# Patient Record
Sex: FEMALE | Race: AMERICAN INDIAN OR ALASKA NATIVE
[De-identification: names, ages, dates, MRNs, and addresses within clinical notes are randomized per-mention and may not be internally consistent; named-entity substitution may affect disease eponyms.]

---

## 2020-03-09 ENCOUNTER — HOSPITAL ENCOUNTER (EMERGENCY)
Dept: HOSPITAL 5 - ED | Age: 30
Discharge: HOME | End: 2020-03-09
Payer: COMMERCIAL

## 2020-03-09 VITALS — SYSTOLIC BLOOD PRESSURE: 123 MMHG | DIASTOLIC BLOOD PRESSURE: 76 MMHG
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
## 2020-03-09 DIAGNOSIS — L29.8: ICD-10-CM

## 2020-03-09 DIAGNOSIS — Z79.899: ICD-10-CM

## 2020-03-09 DIAGNOSIS — Z88.0: ICD-10-CM

## 2020-03-09 DIAGNOSIS — T78.40XA: Primary | ICD-10-CM

## 2020-03-09 DIAGNOSIS — L50.0: ICD-10-CM

## 2020-03-09 PROCEDURE — 96375 TX/PRO/DX INJ NEW DRUG ADDON: CPT

## 2020-03-09 PROCEDURE — 96374 THER/PROPH/DIAG INJ IV PUSH: CPT

## 2020-03-09 PROCEDURE — 99282 EMERGENCY DEPT VISIT SF MDM: CPT

## 2020-03-09 RX ADMIN — FAMOTIDINE ONE MG: 10 INJECTION, SOLUTION INTRAVENOUS at 20:33

## 2020-03-09 RX ADMIN — FAMOTIDINE ONE MG: 10 INJECTION, SOLUTION INTRAVENOUS at 20:30

## 2020-03-09 NOTE — EMERGENCY DEPARTMENT REPORT
Blank Doc





- Documentation


Documentation: 





29-year-old female that presents with hives.  No angioedema present.





This initial assessment/diagnostic orders/clinical plan/treatment(s) is/are 

subject to change based on patient's health status, clinical progression and re-

assessment by fellow clinical providers in the ED.  Further treatment and workup

at subsequent clinical providers discretion.  Patient/guardians urged not to 

elope from the ED as their condition may be serious if not clinically assessed 

and managed.  Initial orders include:


1- Patient sent to [facility] for further evaluation and treatment


2- IV treatment ordered

## 2020-03-09 NOTE — EMERGENCY DEPARTMENT REPORT
ED Allergic Reaction HPI





- General


Chief complaint: Allergic Reaction


Stated complaint: ALLERGIC REACTION


Time Seen by Provider: 03/09/20 19:53


Source: patient


Mode of arrival: Ambulatory


Limitations: No Limitations





- History of Present Illness


Initial Comments: 





Patient is a 29-year-old -American female with no past medical history 

who presents to the ED with complaint of acute onset persistent diffuse itchy 

erythematous maculopapular urticarial rashes after consuming soup made of 

seafood about 1 hour ago.  Patient states that she has never had any allergies 

to seafood but started having diffuse body itching with hives diffusely.  

Patient denies swollen lips or tongue, swollen throat, dysphagia, dysphonia, 

facial swelling, nasal and sinus congestion, dry cough, wheezing, shortness of 

breath, nausea and vomiting or diarrhea and abdominal pain, fever or chills.


MD Complaint: allergic reaction, hives, other (itching )


-: Sudden, hour(s) (1)


Exposure: food


Symptoms: rash, itching.  denies: facial swelling, lip swelling, difficulty 

swallowing, difficulty breathing, orolingual swelling, hoarseness, syncopy, 

dizziness, nausea, vomiting, other, abdominal pain


Severity: moderate


Treatment Prior to Arrival: none


Previous Allergy History: none





- Related Data


                                  Previous Rx's











 Medication  Instructions  Recorded  Last Taken  Type


 


Famotidine [Pepcid] 20 mg PO Q12H #30 tablet 03/09/20 Unknown Rx


 


Prednisone [predniSONE 10 mg 10 mg PO .TAPER #21 tab.ds.pk 03/09/20 Unknown Rx





(6-Day Pack, 21 Tabs)]    


 


diphenhydrAMINE [Benadryl CAP] 25 mg PO Q6HR PRN #30 capsule 03/09/20 Unknown Rx











                                    Allergies











Allergy/AdvReac Type Severity Reaction Status Date / Time


 


Penicillins Allergy  Unknown Verified 03/09/20 19:36














ED Review of Systems


ROS: 


Stated complaint: ALLERGIC REACTION


Other details as noted in HPI





Constitutional: denies: chills, fever


Eyes: denies: eye pain, eye discharge, vision change


ENT: denies: ear pain, throat pain


Respiratory: denies: cough, shortness of breath, wheezing


Cardiovascular: denies: chest pain, palpitations


Endocrine: no symptoms reported


Gastrointestinal: denies: abdominal pain, nausea, diarrhea


Genitourinary: denies: urgency, dysuria, discharge


Musculoskeletal: denies: back pain, joint swelling, arthralgia


Skin: rash, change in color, pruritus, other (Diffuse erythematous maculopapular

urticarial rashes).  denies: lesions


Neurological: denies: headache, weakness, paresthesias


Psychiatric: denies: anxiety, depression


Hematological/Lymphatic: denies: easy bleeding, easy bruising





ED Past Medical Hx





- Past Medical History


Previous Medical History?: No





- Surgical History


Past Surgical History?: No





- Social History


Smoking Status: Never Smoker


Substance Use Type: None





- Medications


Home Medications: 


                                Home Medications











 Medication  Instructions  Recorded  Confirmed  Last Taken  Type


 


Famotidine [Pepcid] 20 mg PO Q12H #30 tablet 03/09/20  Unknown Rx


 


Prednisone [predniSONE 10 mg 10 mg PO .TAPER #21 tab.ds.pk 03/09/20  Unknown Rx





(6-Day Pack, 21 Tabs)]     


 


diphenhydrAMINE [Benadryl CAP] 25 mg PO Q6HR PRN #30 capsule 03/09/20  Unknown 

Rx














ED Physical Exam





- General


Limitations: No Limitations


General appearance: alert, in no apparent distress





- Head


Head exam: Present: atraumatic, normocephalic, normal inspection





- Eye


Eye exam: Present: normal appearance, PERRL, EOMI


Pupils: Present: normal accommodation





- ENT


ENT exam: Present: normal exam, normal orophraynx, mucous membranes moist, TM's 

normal bilaterally, normal external ear exam





- Neck


Neck exam: Present: normal inspection, full ROM.  Absent: tenderness





- Respiratory


Respiratory exam: Present: normal lung sounds bilaterally.  Absent: respiratory 

distress, wheezes, rales, rhonchi, chest wall tenderness, accessory muscle use, 

decreased breath sounds, prolonged expiratory





- Cardiovascular


Cardiovascular Exam: Present: regular rate, normal rhythm, normal heart sounds. 

Absent: systolic murmur, diastolic murmur, rubs, gallop





- GI/Abdominal


GI/Abdominal exam: Present: soft, normal bowel sounds.  Absent: tenderness, 

guarding





- Extremities Exam


Extremities exam: Present: normal inspection, full ROM, normal capillary refill





- Back Exam


Back exam: Present: normal inspection, full ROM.  Absent: tenderness, muscle 

spasm, paraspinal tenderness, vertebral tenderness





- Neurological Exam


Neurological exam: Present: alert, oriented X3, CN II-XII intact, normal gait, 

reflexes normal





- Psychiatric


Psychiatric exam: Present: normal affect, normal mood





- Skin


Skin exam: Present: warm, dry, intact, normal color, rash (Diffuse erythematous 

maculopapular urticarial rashes), erythema, urticaria





ED Course





                                   Vital Signs











  03/09/20 03/09/20





  19:34 19:54


 


Temperature 98.9 F 98 F


 


Pulse Rate 94 H 93 H


 


Respiratory 18 18





Rate  


 


Blood Pressure 123/76 123/76


 


O2 Sat by Pulse 100 100





Oximetry  














ED Medical Decision Making





- Medical Decision Making





This is a 29-year-old female with no past medical history who presented to the 

ED with acute onset persistent diffuse body itching with acute diffuse macu

lopapular urticarial rashes after eating seafood.  In the ED, patient is alert 

and oriented x3 and is not in distress with normal vital signs, resting 

comfortably talking to the fianc in the room and eating chicken.  Patient was 

treated for acute allergic reaction with steroids, Pepcid and Benadryl.  On 

reevaluation, patient's hives and itching resolved.  Patient was discharged home

on no medications and advised to follow-up with her primary care physician in 5 

to 7 days for reevaluation or return to the ED immediately if symptoms get 

worse.





- Differential Diagnosis


Urticaria; Allergic reaction; seafood allergy


Critical care attestation.: 


If time is entered above; I have spent that time in minutes in the direct care 

of this critically ill patient, excluding procedure time.








ED Disposition


Clinical Impression: 


 Acute urticaria, Itching with irritation





Acute allergic reaction


Qualifiers:


 Encounter type: initial encounter Qualified Code(s): T78.40XA - Allergy, 

unspecified, initial encounter





Disposition: DC-01 TO HOME OR SELFCARE


Is pt being admited?: No


Does the pt Need Aspirin: No


Condition: Stable


Instructions:  Itchy Skin (ED), Urticaria (ED), Allergies (ED)


Additional Instructions: 


Take medication with food, drink plenty of fluids and follow-up with your [facility] care physician in 7 to 10 days for reevaluation.  Return to the ED 

immediately if symptoms get worse.


Prescriptions: 


diphenhydrAMINE [Benadryl CAP] 25 mg PO Q6HR PRN #30 capsule


 PRN Reason: Itching


Famotidine [Pepcid] 20 mg PO Q12H #30 tablet


Prednisone [predniSONE 10 mg (6-Day Pack, 21 Tabs)] 10 mg PO .TAPER #21 

tab.ds.pk


Referrals: 


[facility] [Outside] - 3-5 Days


Forms:  Work/School Release Form(ED)


Time of Disposition: 21:56


Print Language: ENGLISH

## 2020-10-07 ENCOUNTER — HOSPITAL ENCOUNTER (EMERGENCY)
Dept: HOSPITAL 5 - ED | Age: 30
LOS: 1 days | Discharge: LEFT BEFORE DISCHARGE | End: 2020-10-08
Payer: COMMERCIAL

## 2020-10-07 VITALS — DIASTOLIC BLOOD PRESSURE: 76 MMHG | SYSTOLIC BLOOD PRESSURE: 119 MMHG

## 2020-10-07 DIAGNOSIS — L50.0: Primary | ICD-10-CM

## 2020-10-07 DIAGNOSIS — Z53.21: ICD-10-CM
